# Patient Record
(demographics unavailable — no encounter records)

---

## 2025-03-21 NOTE — PHYSICAL EXAM
[Normal rectal exam] : exam was normal [Excoriation] : no perianal excoriation [Multiple Sinus Tracts] : no perianal sinus tracts [Fistula] : no fistulas [Wart] : no warts [Pilonidal Cyst] : no pilonidal cysts [Pilonidal Sinus] : no pilonidal sinus [Nonprolapsing] : a nonprolapsing (grade I) [Tender, Swollen] : nontender, non-swollen [Thrombosed] : that was not thrombosed [Normal] : was normal [None] : there was no rectal abscess [Stool Sample Taken] : no stool obtained on rectal exam [Fecal Impaction] : no fecal impaction was present [Alert] : alert [Oriented to Person] : oriented to person [Oriented to Place] : oriented to place [Oriented to Time] : oriented to time [Calm] : calm [de-identified] : MILLI: Nontender, no gross blood, slightly prominent internal hemorrhoids [de-identified] : NAD [de-identified] : Nonlabored breathing [de-identified] : Normal rate

## 2025-03-21 NOTE — PROCEDURE
[FreeTextEntry1] : Anoscopy  I discussed the reason for the procedure, and the risks and benefits with the patient. Risks including bleeding and discomfort were discussed. The patient understood or discussion and would like to proceed with the procedure.  After completing a digital rectal exam, a well lubricated anoscope was gently inserted into the anus. Complete inspection was performed. No tenderness on insertion of the anoscope, and the procedure was tolerated well. No fissures, fistula openings, or masses were identified.  Findings on anoscopy: Slightly prominent internal hemorrhoids, banding performed LL x 1, RP x 1

## 2025-03-21 NOTE — REASON FOR VISIT
[Initial Evaluation] : an initial evaluation [FreeTextEntry1] : Hemorrhoids, pruritus, burning sensation

## 2025-03-21 NOTE — HISTORY OF PRESENT ILLNESS
[FreeTextEntry1] : 43-year-old male who presents for evaluation of hemorrhoids, pruritus, burning sensation. Patient was seen ~ 4 years ago with similar symptoms. He reports most days over the past 2 years he experiences pruritus and a burning sensation. OTC medication relieves his symptoms for 4-5 days then they recur. No other symptoms reported, he denies straining, constipation, denies changes in stool quality and bowel function. Last colonoscopy ~3 years ago.

## 2025-03-21 NOTE — ASSESSMENT
[FreeTextEntry1] : 43-year-old male who presents for evaluation of hemorrhoids, pruritus, and burning. Findings on anoscopy: Slightly prominent internal hemorrhoids, banding performed LL x 1, RP x 1. We discussed treatment of hemorrhoids conservatively, starting a fiber supplement, increasing water intake, and use of a barrier ointment  Plan of care for Hemorrhoids Add daily fiber supplementation to diet, Metamucil, Benefiber, or fiber supplement of preference Daily over the counter stool softener (eg.Colace) to prevent straining Increased fluid intake, preferably water Refrain from straining or lingering (eg. reading) on the toilet Warm sitz bath after bowel movements, no more than 3/day, do not exceed 10 minutes per sitz bath Post band ligation instruction given - For worsening pain, fevers, or trouble urinating, patient advised to call office to arrange for urgent re-evaluation. Patient will follow up in 4 weeks or as needed